# Patient Record
Sex: FEMALE | Race: ASIAN | URBAN - METROPOLITAN AREA
[De-identification: names, ages, dates, MRNs, and addresses within clinical notes are randomized per-mention and may not be internally consistent; named-entity substitution may affect disease eponyms.]

---

## 2020-05-22 ENCOUNTER — APPOINTMENT (RX ONLY)
Dept: URBAN - METROPOLITAN AREA CLINIC 81 | Facility: CLINIC | Age: 85
Setting detail: DERMATOLOGY
End: 2020-05-22

## 2020-05-22 DIAGNOSIS — L72.8 OTHER FOLLICULAR CYSTS OF THE SKIN AND SUBCUTANEOUS TISSUE: ICD-10-CM

## 2020-05-22 DIAGNOSIS — L82.1 OTHER SEBORRHEIC KERATOSIS: ICD-10-CM

## 2020-05-22 DIAGNOSIS — L82.0 INFLAMED SEBORRHEIC KERATOSIS: ICD-10-CM

## 2020-05-22 PROCEDURE — ? COUNSELING

## 2020-05-22 PROCEDURE — ? LIQUID NITROGEN

## 2020-05-22 PROCEDURE — 99202 OFFICE O/P NEW SF 15 MIN: CPT | Mod: 25

## 2020-05-22 PROCEDURE — 17110 DESTRUCTION B9 LES UP TO 14: CPT

## 2020-05-22 ASSESSMENT — LOCATION SIMPLE DESCRIPTION DERM: LOCATION SIMPLE: RIGHT CHEEK

## 2020-05-22 ASSESSMENT — LOCATION DETAILED DESCRIPTION DERM
LOCATION DETAILED: RIGHT CENTRAL BUCCAL CHEEK
LOCATION DETAILED: RIGHT INFERIOR MEDIAL MALAR CHEEK

## 2020-05-22 ASSESSMENT — LOCATION ZONE DERM: LOCATION ZONE: FACE

## 2020-05-22 NOTE — PROCEDURE: LIQUID NITROGEN
Pared With?: 15 blade
Medical Necessity Information: It is in your best interest to select a reason for this procedure from the list below. All of these items fulfill various CMS LCD requirements except the new and changing color options.
Render Post-Care Instructions In Note?: no
Post-Care Instructions: I reviewed with the patient in detail post-care instructions. Patient is to wear sunprotection, and avoid picking at any of the treated lesions. Pt may apply Vaseline to crusted or scabbing areas.
Medical Necessity Clause: This procedure was medically necessary because the lesions that were treated were:
Consent: The patient's consent was obtained including but not limited to risks of crusting, scabbing, blistering, scarring, darker or lighter pigmentary change, recurrence, incomplete removal and infection.
Detail Level: Detailed

## 2022-07-21 ENCOUNTER — NEW PATIENT (OUTPATIENT)
Dept: URBAN - METROPOLITAN AREA CLINIC 35 | Facility: CLINIC | Age: 87
End: 2022-07-21

## 2022-07-21 DIAGNOSIS — H40.1130: ICD-10-CM

## 2022-07-21 DIAGNOSIS — H35.371: ICD-10-CM

## 2022-07-21 DIAGNOSIS — Z94.7: ICD-10-CM

## 2022-07-21 PROCEDURE — 92133 CPTRZD OPH DX IMG PST SGM ON: CPT

## 2022-07-21 PROCEDURE — 92004 COMPRE OPH EXAM NEW PT 1/>: CPT

## 2022-07-21 PROCEDURE — 92134 CPTRZ OPH DX IMG PST SGM RTA: CPT

## 2022-07-21 RX ORDER — ERYTHROMYCIN 5 MG/G
OINTMENT OPHTHALMIC EVERY EVENING
Start: 2022-07-21 | End: 2022-07-28

## 2022-07-21 ASSESSMENT — TONOMETRY
OD_IOP_MMHG: 30
OS_IOP_MMHG: 10

## 2022-07-21 ASSESSMENT — VISUAL ACUITY
OU_CC: J3
OS_PH: 20/40-2
OU_SC: J3
OU_CC: 20/50
OS_SC: 20/100
OS_CC: J3
OS_CC: 20/50
OS_SC: J3

## 2022-07-21 ASSESSMENT — KERATOMETRY
OS_K2POWER_DIOPTERS: 48.75
OS_AXISANGLE2_DEGREES: 180
OS_K1POWER_DIOPTERS: 45.00
OS_AXISANGLE_DEGREES: 90

## 2022-08-17 ENCOUNTER — FOLLOW UP (OUTPATIENT)
Dept: URBAN - METROPOLITAN AREA CLINIC 35 | Facility: CLINIC | Age: 87
End: 2022-08-17

## 2022-08-17 DIAGNOSIS — H40.1130: ICD-10-CM

## 2022-08-17 DIAGNOSIS — H01.02B: ICD-10-CM

## 2022-08-17 DIAGNOSIS — H01.02A: ICD-10-CM

## 2022-08-17 PROCEDURE — 92012 INTRM OPH EXAM EST PATIENT: CPT

## 2022-08-17 ASSESSMENT — TONOMETRY
OS_IOP_MMHG: 20
OD_IOP_MMHG: 22
OS_IOP_MMHG: 16
OD_IOP_MMHG: 23

## 2022-08-17 ASSESSMENT — VISUAL ACUITY
OU_SC: 20/60
OS_SC: 20/60-1

## 2022-10-26 ENCOUNTER — FOLLOW UP (OUTPATIENT)
Dept: URBAN - METROPOLITAN AREA CLINIC 35 | Facility: CLINIC | Age: 87
End: 2022-10-26

## 2022-10-26 DIAGNOSIS — H40.1130: ICD-10-CM

## 2022-10-26 DIAGNOSIS — Z94.7: ICD-10-CM

## 2022-10-26 DIAGNOSIS — H01.02B: ICD-10-CM

## 2022-10-26 DIAGNOSIS — H01.02A: ICD-10-CM

## 2022-10-26 PROCEDURE — 92012 INTRM OPH EXAM EST PATIENT: CPT

## 2022-10-26 ASSESSMENT — TONOMETRY
OD_IOP_MMHG: 21
OS_IOP_MMHG: 16

## 2022-10-26 ASSESSMENT — VISUAL ACUITY: OS_SC: 20/70+2

## 2023-01-26 ENCOUNTER — PREPPED CHART (OUTPATIENT)
Dept: URBAN - METROPOLITAN AREA CLINIC 35 | Facility: CLINIC | Age: 88
End: 2023-01-26